# Patient Record
Sex: FEMALE | Race: WHITE | Employment: OTHER | URBAN - METROPOLITAN AREA
[De-identification: names, ages, dates, MRNs, and addresses within clinical notes are randomized per-mention and may not be internally consistent; named-entity substitution may affect disease eponyms.]

---

## 2019-07-22 ENCOUNTER — HOSPITAL ENCOUNTER (EMERGENCY)
Age: 49
Discharge: HOME | End: 2019-07-22
Attending: EMERGENCY MEDICINE
Payer: SELF-PAY

## 2019-07-22 ENCOUNTER — APPOINTMENT (OUTPATIENT)
Dept: GENERAL RADIOLOGY | Age: 49
End: 2019-07-22
Attending: PHYSICIAN ASSISTANT
Payer: SELF-PAY

## 2019-07-22 VITALS
OXYGEN SATURATION: 99 % | RESPIRATION RATE: 16 BRPM | WEIGHT: 141.98 LBS | TEMPERATURE: 98.2 F | SYSTOLIC BLOOD PRESSURE: 182 MMHG | HEART RATE: 78 BPM | BODY MASS INDEX: 22.28 KG/M2 | HEIGHT: 67 IN | DIASTOLIC BLOOD PRESSURE: 97 MMHG

## 2019-07-22 DIAGNOSIS — F17.200 SMOKING ADDICTION: ICD-10-CM

## 2019-07-22 DIAGNOSIS — R07.81 RIB PAIN ON LEFT SIDE: Primary | ICD-10-CM

## 2019-07-22 DIAGNOSIS — R03.0 ELEVATED BLOOD PRESSURE READING: ICD-10-CM

## 2019-07-22 PROCEDURE — 99282 EMERGENCY DEPT VISIT SF MDM: CPT

## 2019-07-22 PROCEDURE — 71101 X-RAY EXAM UNILAT RIBS/CHEST: CPT

## 2019-07-22 RX ORDER — IBUPROFEN 600 MG/1
600 TABLET ORAL
Qty: 20 TAB | Refills: 0 | Status: SHIPPED | OUTPATIENT
Start: 2019-07-22

## 2019-07-22 RX ORDER — HYDROCODONE BITARTRATE AND ACETAMINOPHEN 5; 325 MG/1; MG/1
1 TABLET ORAL
Qty: 9 TAB | Refills: 0 | Status: SHIPPED | OUTPATIENT
Start: 2019-07-22 | End: 2019-07-25

## 2019-07-22 NOTE — LETTER
[address] 
[facility] EMERGENCY DEPT 
[address] [facility] [address] 
[phone] Work/School Note Date: 7/22/2019 To Whom It May concern: 
 
Elizabeth Ansari was seen and treated today in the emergency room by the following provider(s): 
Attending Provider: Lalit Archer MD 
Physician Assistant: EMMA Alejandro. She was accompanied by her mother. Sincerely, EMMA Neves

## 2019-07-22 NOTE — ED NOTES
Discharge instructions reviewed with patient, copy given by Carlota Riedel, PA. Pt is accomponied by family, denies use of wheelchair.

## 2019-07-22 NOTE — ED PROVIDER NOTES
EMERGENCY DEPARTMENT HISTORY AND PHYSICAL EXAM      Date: 7/22/2019  Patient Name: Mili Gonzalez    History of Presenting Illness     Chief Complaint   Patient presents with    Rib Pain     pt reports left rib pain since last Sunday when getting elbowed in rib on a ride at [facility]       History Provided By: Patient    HPI: Mili Gonzalez, 46 y.o. female presents ambulatory to the ED with cc of 1 week 5 out of 10 constant, aching left-sided rib pain that is worse with movement and palpation. She tells me her symptoms started when she was on a ride at [facility] and was accidentally elbowed in the ribs. She has been taking BC powder with some relief however her symptoms return. There is no chest pain per se and no shortness of breath. There are no other complaints, changes, or physical findings at this time. PCP: Bebo Lew MD    Current Outpatient Medications   Medication Sig Dispense Refill    HYDROcodone-acetaminophen (LORTAB 5-325) 5-325 mg per tablet Take 1 Tab by mouth every eight (8) hours as needed for Pain for up to 3 days. Max Daily Amount: 3 Tabs. 9 Tab 0    ibuprofen (MOTRIN) 600 mg tablet Take 1 Tab by mouth every eight (8) hours as needed for Pain. 20 Tab 0     Past History     Past Medical History:  Past Medical History:   Diagnosis Date    Psychiatric disorder     anxiety       Past Surgical History:  No past surgical history on file. Family History:  No family history on file. Social History:  Social History     Tobacco Use    Smoking status: Current Every Day Smoker     Packs/day: 0.50   Substance Use Topics    Alcohol use: Yes    Drug use: Not on file       Allergies:  No Known Allergies  Review of Systems   Review of Systems   Constitutional: Negative for fatigue and fever. HENT: Negative for ear pain and sore throat. Eyes: Negative for pain, redness and visual disturbance. Respiratory: Negative for cough and shortness of breath. Cardiovascular: Negative for chest pain and palpitations. Gastrointestinal: Negative for abdominal pain, nausea and vomiting. Genitourinary: Negative for dysuria, frequency and urgency. Musculoskeletal: Negative for back pain, gait problem, neck pain and neck stiffness. Left-sided rib pain   Skin: Negative for rash and wound. Neurological: Negative for dizziness, weakness, light-headedness, numbness and headaches. Physical Exam   Physical Exam   Constitutional: She is oriented to person, place, and time. She appears well-developed and well-nourished. Non-toxic appearance. No distress. HENT:   Head: Normocephalic and atraumatic. Right Ear: External ear normal.   Left Ear: External ear normal.   Nose: Nose normal.   Mouth/Throat: Uvula is midline. No trismus in the jaw. Eyes: Pupils are equal, round, and reactive to light. Conjunctivae and EOM are normal. No scleral icterus. Neck: Normal range of motion and full passive range of motion without pain. Cardiovascular: Normal rate and regular rhythm. Pulmonary/Chest: Effort normal. No accessory muscle usage. No tachypnea. No respiratory distress. She has no decreased breath sounds. She has no wheezes. She exhibits tenderness and bony tenderness. Symmetric, full chest wall expansion with deep inspiration. Breathing unlabored; lungs are clear  No bruising, redness or swelling  Left sided rib tenderness     Abdominal: Soft. There is no tenderness. Musculoskeletal: Normal range of motion. Neurological: She is alert and oriented to person, place, and time. She is not disoriented. No cranial nerve deficit. GCS eye subscore is 4. GCS verbal subscore is 5. GCS motor subscore is 6. Skin: Skin is intact. No rash noted. Psychiatric: She has a normal mood and affect. Her speech is normal.   Nursing note and vitals reviewed.     Diagnostic Study Results     Labs -   No results found for this or any previous visit (from the past 12 hour(s)). Radiologic Studies -   XR RIBS LT W PA CXR MIN 3 V   Final Result   IMPRESSION: No acute abnormality demonstrated. CT Results  (Last 48 hours)    None        CXR Results  (Last 48 hours)    None        Medical Decision Making   I am the first provider for this patient. I reviewed the vital signs, available nursing notes, past medical history, past surgical history, family history and social history. Vital Signs-Reviewed the patient's vital signs. Patient Vitals for the past 12 hrs:   Temp Pulse Resp BP SpO2   07/22/19 1034 98.2 °F (36.8 °C) 78 16 (!) 182/97 99 %       Pulse Oximetry Analysis - 99% on room air    Records Reviewed: Nursing Notes, Old Medical Records, Previous Radiology Studies, Previous Laboratory Studies and     Provider Notes (Medical Decision Making):   DDx: Sprain, strain, fracture, pneumothorax    TOBACCO COUNSELING:  Spent 1-5 minutes discussing the risks of smoking and the benefits of smoking cessation as well as the long term sequelae of smoking with the pt who verbalized his understanding. Reviewed strategies for success, including gradually decreasing the number of cigarettes smoked a day. HYPERTENSION COUNSELING:  Patient denies chest pain, headache, shortness of breath. Patient is made aware of their elevated blood pressure and is instructed to follow up this week with their Primary Care for a recheck. Patient is counseled regarding consequences of chronic, uncontrolled hypertension including kidney disease, heart disease, stroke or even death. Patient states their understanding. ED Course:   Initial assessment performed. The patients presenting problems have been discussed, and they are in agreement with the care plan formulated and outlined with them. I have encouraged them to ask questions as they arise throughout their visit. Disposition:  Discharge    PLAN:  1.    Current Discharge Medication List      START taking these medications    Details ibuprofen (MOTRIN) 600 mg tablet Take 1 Tab by mouth every eight (8) hours as needed for Pain. Qty: 20 Tab, Refills: 0         CONTINUE these medications which have CHANGED    Details   HYDROcodone-acetaminophen (LORTAB 5-325) 5-325 mg per tablet Take 1 Tab by mouth every eight (8) hours as needed for Pain for up to 3 days. Max Daily Amount: 3 Tabs. Qty: 9 Tab, Refills: 0    Associated Diagnoses: Rib pain on left side         STOP taking these medications       naproxen (NAPROSYN) 500 mg tablet Comments:   Reason for Stopping:         gabapentin (NEURONTIN) 100 mg capsule Comments:   Reason for Stopping:         zolpidem (AMBIEN) 5 mg tablet Comments:   Reason for Stopping:         ALPRAZolam (XANAX) 0.5 mg tablet Comments:   Reason for Stoppin.   Follow-up Information     Follow up With Specialties Details Why Contact Info    Maria Del Rosario Wheatley MD Family Practice Schedule an appointment as soon as possible for a visit As needed [address] [place] [address]  [phone]          Return to ED if worse     Diagnosis     Clinical Impression:   1. Rib pain on left side    2. Smoking addiction    3.  Elevated blood pressure reading